# Patient Record
Sex: MALE | Race: WHITE | NOT HISPANIC OR LATINO | URBAN - METROPOLITAN AREA
[De-identification: names, ages, dates, MRNs, and addresses within clinical notes are randomized per-mention and may not be internally consistent; named-entity substitution may affect disease eponyms.]

---

## 2017-06-05 ENCOUNTER — CONSULT (OUTPATIENT)
Dept: ONCOLOGY | Facility: CLINIC | Age: 52
End: 2017-06-05

## 2017-06-05 VITALS
BODY MASS INDEX: 31.67 KG/M2 | TEMPERATURE: 98.4 F | WEIGHT: 209 LBS | RESPIRATION RATE: 18 BRPM | HEIGHT: 68 IN | HEART RATE: 69 BPM | DIASTOLIC BLOOD PRESSURE: 94 MMHG | SYSTOLIC BLOOD PRESSURE: 134 MMHG

## 2017-06-05 DIAGNOSIS — D68.51 HETEROZYGOUS FACTOR V LEIDEN MUTATION (HCC): ICD-10-CM

## 2017-06-05 DIAGNOSIS — I26.99 BILATERAL PULMONARY EMBOLISM (HCC): Primary | ICD-10-CM

## 2017-06-05 PROCEDURE — 99204 OFFICE O/P NEW MOD 45 MIN: CPT | Performed by: INTERNAL MEDICINE
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
## 2018-06-04 ENCOUNTER — OFFICE VISIT (OUTPATIENT)
Dept: ONCOLOGY | Facility: CLINIC | Age: 53
End: 2018-06-04

## 2018-06-04 VITALS
HEART RATE: 65 BPM | WEIGHT: 199 LBS | SYSTOLIC BLOOD PRESSURE: 144 MMHG | BODY MASS INDEX: 30.16 KG/M2 | RESPIRATION RATE: 16 BRPM | HEIGHT: 68 IN | DIASTOLIC BLOOD PRESSURE: 96 MMHG | TEMPERATURE: 97.6 F

## 2018-06-04 DIAGNOSIS — D68.51 HETEROZYGOUS FACTOR V LEIDEN MUTATION (HCC): ICD-10-CM

## 2018-06-04 DIAGNOSIS — I26.99 BILATERAL PULMONARY EMBOLISM (HCC): Primary | ICD-10-CM

## 2018-06-04 PROCEDURE — 99213 OFFICE O/P EST LOW 20 MIN: CPT | Performed by: INTERNAL MEDICINE

## 2018-06-04 NOTE — PROGRESS NOTES
"PROBLEM LIST:    1.  Pulmonary embolism with DVT-unprovoked\  2.  Heterozygote factor V Leiden mutation  3.  Completed 1 year of anticoagulation with Xarelto      REASON FOR VISIT: Management of my pulmonary embolism    HISTORY OF PRESENT ILLNESS:   52 y.o.  male presents today for follow-up of his pulmonary embolism.  He is completed 1 year of anticoagulation.  He is training for a half marathon.  He reports significant joint complaints.  Otherwise clinically doing well.  His activity level has increased since I last saw him.    Past medical history, social history and family history was reviewed and unchanged from prior visit.    Review of Systems:    Review of Systems - Oncology   A comprehensive 14 point review of systems was performed and was negative except as mentioned.      Medications:  The current medication list was reviewed in the EMR    ALLERGIES:  No Known Allergies      Physical Exam    VITAL SIGNS:  /96 Comment: LUE  Pulse 65   Temp 97.6 °F (36.4 °C) (Temporal Artery )   Resp 16   Ht 172.7 cm (68\")   Wt 90.3 kg (199 lb)   BMI 30.26 kg/m²     Wt Readings from Last 3 Encounters:   06/04/18 90.3 kg (199 lb)   06/05/17 94.8 kg (209 lb)        Performance Status: 0    General: well appearing, in no acute distress  HEENT: sclera anicteric, oropharynx clear, neck is supple  Lymphatics: no cervical, supraclavicular, or axillary adenopathy  Cardiovascular: regular rate and rhythm, no murmurs, rubs or gallops  Lungs: clear to auscultation bilaterally  Abdomen: soft, nontender, nondistended.  No palpable organomegaly  Extremities: no lower extremity edema  Skin: no rashes, lesions, bruising, or petechiae  Msk:  Shows no weakness of the large muscle groups  Psych: Mood is stable           Assessment/Plan    1.  Pulmonary embolism with DVT: He has completed 1 year of anticoagulation.  I don't think his Leiden mutation is playing a significant role in the cause.  His activity level is improved.  I " think it's reasonable to consider stopping his anticoagulation.  I recommended baby aspirin a day to reduce his risk.  Otherwise no other further intervention is necessary.  He needs to be aware of times of risk including surgeries and prolonged periods of activity.  He also should maintain a well hydrated status especially in the summers.  I will see him on an as-needed basis.  If he has any issues in the interim he can always give me a call.      Return in (Approximately): АНДРЕЙ Negron MD  [facility] Hematology and Oncology    6/4/2018         Please note that portions of this note may have been completed with a voice recognition program. Efforts were made to edit the dictations, but occasionally words are mistranscribed.